# Patient Record
(demographics unavailable — no encounter records)

---

## 2025-06-23 NOTE — HISTORY OF PRESENT ILLNESS
[de-identified] : 23 year old male patient here today with aide for initial consultation for clogged ears, bilateral impacted cerumen - annual check up. Denies any otalgia, otorrhea, tinnitus, or trouble hearing.

## 2025-06-23 NOTE — REASON FOR VISIT
[Initial Evaluation] : an initial evaluation for [FreeTextEntry2] : clogged ears, bilateral impacted cerumen - annual check up